# Patient Record
Sex: FEMALE | Race: WHITE
[De-identification: names, ages, dates, MRNs, and addresses within clinical notes are randomized per-mention and may not be internally consistent; named-entity substitution may affect disease eponyms.]

---

## 2019-08-18 NOTE — ER DOCUMENT REPORT
HPI





- HPI


Patient complains to provider of: uti


Time Seen by Provider: 08/18/19 16:02


Onset: Yesterday


Onset/Duration: Gradual


Quality of pain: Burning


Pain Level: 4


Context: 





Patient reports dysuria that started yesterday.  Patient is concerned she may 

have UTI.  Patient denies any flank pain or fever.  Patient denies any nausea or

vomiting.


Associated Symptoms: denies: Fever, Nausea, Vomiting


Exacerbated by: Denies


Relieved by: Denies


Similar symptoms previously: Yes


Recently seen / treated by doctor: No





- ROS


ROS below otherwise negative: Yes


Systems Reviewed and Negative: Yes All other systems reviewed and negative





- CONSTITUTIONAL


Constitutional: DENIES: Fever, Chills





- GASTROINTESTINAL


Gastrointestinal: REPORTS: Abdominal Pain.  DENIES: Nausea, Patient vomiting





- URINARY


Urinary: REPORTS: Dysuria, Frequency.  DENIES: Urgency





- REPRODUCTIVE


Reproductive: DENIES: Pregnant:





- MUSCULOSKELETAL


Musculoskeletal: DENIES: Back Pain





- DERM


Skin Color: Normal


Skin Problems: None





Past Medical History





- General


Information source: Patient





- Social History


Smoking Status: Current Every Day Smoker


Smoking Education Provided: Yes


Frequency of alcohol use: None


Drug Abuse: None


Occupation: none


Family History: Reviewed & Not Pertinent





- Medical History


Medical History: Negative


Surgical Hx: Negative





Vertical Provider Document





- CONSTITUTIONAL


Agree With Documented VS: Yes


Exam Limitations: No Limitations


General Appearance: WD/WN, No Apparent Distress





- INFECTION CONTROL


TRAVEL OUTSIDE OF THE [place] IN LAST 30 DAYS: No





- HEENT


HEENT: Atraumatic, Normocephalic





- NECK


Neck: Normal Inspection, Supple





- RESPIRATORY


Respiratory: Breath Sounds Normal, No Respiratory Distress





- CARDIOVASCULAR


Cardiovascular: Regular Rate, Regular Rhythm





- GI/ABDOMEN


Gastrointestinal: Abdomen Soft, Abdomen Tender - suprapubic





- BACK


Back: Normal Inspection.  negative: CVA Tenderness-Right, CVA Tenderness-Left





- MUSCULOSKELETAL/EXTREMETIES


Musculoskeletal/Extremeties: MAEW, FROM





- NEURO


Level of Consciousness: Awake, Alert, Appropriate


Motor/Sensory: No Motor Deficit





- DERM


Integumentary: Warm, Dry, No Rash





Course





- Re-evaluation


Re-evalutation: 





08/18/19 16:40


Patient with UTI symptoms.  No CVA tenderness, no fever. No concern for 

pyelonephritis.  Patient nontoxic in appearance.  Urine culture is pending at 

this time.





- Vital Signs


Vital signs: 


                                        











Temp Pulse Resp BP Pulse Ox


 


 97.6 F   95   16   126/70 H  97 


 


 08/18/19 15:20  08/18/19 15:20  08/18/19 15:20  08/18/19 15:20  08/18/19 15:20














- Laboratory


Laboratory results interpreted by me: 


                                        











  08/18/19





  16:00


 


Urine Protein  30 H


 


Urine Blood  MODERATE H


 


Ur Leukocyte Esterase  MODERATE H











08/18/19 16:40


                               Labs- Entire Visit











  08/18/19





  16:00


 


Urine Color  YELLOW


 


Urine Appearance  SLIGHTLY-CLOUDY


 


Urine pH  5.0


 


Ur Specific Gravity  1.023


 


Urine Protein  30 H


 


Urine Glucose (UA)  NEGATIVE


 


Urine Ketones  NEGATIVE


 


Urine Blood  MODERATE H


 


Urine Nitrite  NEGATIVE


 


Urine Bilirubin  NEGATIVE


 


Urine Urobilinogen  NEGATIVE


 


Ur Leukocyte Esterase  MODERATE H


 


Urine WBC (Auto)  94


 


Urine RBC (Auto)  17


 


Urine Bacteria (Auto)  TRACE


 


Squamous Epi Cells Auto  5


 


Urine Mucus (Auto)  OCC


 


Urine Ascorbic Acid  NEGATIVE














Discharge





- Discharge


Clinical Impression: 


UTI (urinary tract infection)


Qualifiers:


 Urinary tract infection type: site unspecified Hematuria presence: with 

hematuria Qualified Code(s): N39.0 - Urinary tract infection, site not specified





Condition: Stable


Disposition: HOME, SELF-CARE


Instructions:  Cephalexin ([facility]), Urinary Anesthetic Agent ([facility]), Urinary Tract 

Infection ([facility])


Additional Instructions: 


Return immediately for any new or worsening symptoms





Followup with your primary care provider, call tomorrow to make a followup 

appointment





Urine culture is pending, we will call if you need any different treatment


Prescriptions: 


Cephalexin Monohydrate [Keflex 500 mg Capsule] 500 mg PO Q6H 5 Days  capsule


Phenazopyridine HCl [Pyridium 200 mg Tablet] 200 mg PO TID #15 tablet


Forms:  Smoking Cessation Education


Referrals: 


[facility] [Provider Group] - Follow up as needed

## 2020-02-06 ENCOUNTER — HOSPITAL ENCOUNTER (EMERGENCY)
Dept: HOSPITAL 62 - ER | Age: 20
Discharge: HOME | End: 2020-02-06
Payer: COMMERCIAL

## 2020-02-06 VITALS — DIASTOLIC BLOOD PRESSURE: 68 MMHG | SYSTOLIC BLOOD PRESSURE: 124 MMHG

## 2020-02-06 DIAGNOSIS — O26.892: Primary | ICD-10-CM

## 2020-02-06 DIAGNOSIS — J18.9: ICD-10-CM

## 2020-02-06 DIAGNOSIS — Z3A.21: ICD-10-CM

## 2020-02-06 PROCEDURE — 71046 X-RAY EXAM CHEST 2 VIEWS: CPT

## 2020-02-06 NOTE — ER DOCUMENT REPORT
ED General





- General


Chief Complaint: Cough


Stated Complaint: COUGH


Time Seen by Provider: 02/06/20 08:42


Primary Care Provider: 


TITO CLEVELAND MD [Primary Care Provider] - Follow up as needed


Notes: 





Patient is a 19-year-old white female with past medical history of allergic 

rhinitis who is currently 21 weeks gestation who presents to the emergency 

department with a chief complaint of cough for the past 3 days.  She states it 

started off as a runny nose with intermittent nasal congestion.  She admits to 

associated postnasal drainage that is thick.  She states she is developed a 

cough that is dry over the past 3 days.  States that when she coughs she feels 

pain in her anterior chest.  She reports with deep breathing of cool air if she 

feels a burning sensation in the front of the chest.  She denies any known 

fever, nausea, vomiting, diarrhea, abdominal pain or vaginal bleeding.  Admits 

to sick contacts


TRAVEL OUTSIDE OF THE [place] IN LAST 30 DAYS: No





- Related Data


Allergies/Adverse Reactions: 


                                        





No Known Allergies Allergy (Verified 02/06/20 04:23)


   








Home Medications: PRE NATALS





Past Medical History





- Social History


Smoking Status: Former Smoker


Family History: Reviewed & Not Pertinent


Patient has suicidal ideation: No


Patient has homicidal ideation: No


Renal/ Medical History: Denies: Hx Peritoneal Dialysis





Review of Systems





- Review of Systems


EENT: Nose congestion, Nose discharge


Respiratory: Cough


-: Yes All other systems reviewed and negative





Physical Exam





- Vital signs


Vitals: 


                                        











Temp Pulse Resp BP Pulse Ox


 


 98.8 F   98   18   127/61 H  98 


 


 02/06/20 04:12  02/06/20 04:12  02/06/20 04:12  02/06/20 04:12  02/06/20 04:12














- General


General appearance: Appears well, Alert





- HEENT


Head: Normocephalic, Atraumatic


Eyes: Normal


Conjunctiva: Normal


Extraocular movements intact: Yes


Eyelashes: Normal


Pupils: PERRL


Ears: Normal


External canal: Normal


Tympanic membrane: Normal


Sinus: Normal


Nasal: Normal


Mouth/Lips: Normal


Mucous membranes: Normal


Pharynx: Normal


Neck: Normal





- Respiratory


Respiratory status: No respiratory distress


Chest status: Nontender


Breath sounds: Normal


Chest palpation: Normal





- Cardiovascular


Rhythm: Regular


Heart sounds: Normal auscultation





- Abdominal


Inspection: Normal, Gravid female


Distension: No distension


Bowel sounds: Normal


Tenderness: Nontender


Organomegaly: No organomegaly





- Neurological


Neuro grossly intact: Yes


Cognition: Normal


Orientation: AAOx4


Levels Coma Scale Eye Opening: Spontaneous


Levels Coma Scale Verbal: Oriented


Levels Coma Scale Motor: Obeys Commands


Jeremi Coma Scale Total: 15


Speech: Normal





- Psychological


Associated symptoms: Normal affect, Normal mood





- Skin


Skin Temperature: Warm


Skin Moisture: Dry


Skin Color: Normal





Course





- Re-evaluation


Re-evalutation: 





02/06/20 10:53


X-ray showing what appears to be pneumonia per radiologist.  Patient will be 

placed on Zithromax.  Counseled her regarding the importance of outpatient 

follow-up and advised that she return here or any ER immediately with any new, 

persistent or worsening symptoms.  She verbalized understood and agreed.





- Vital Signs


Vital signs: 


                                        











Temp Pulse Resp BP Pulse Ox


 


 97.9 F   106 H  20   117/68   99 


 


 02/06/20 09:09  02/06/20 09:09  02/06/20 09:09  02/06/20 09:09  02/06/20 09:09














Discharge





- Discharge


Clinical Impression: 


Pneumonia


Qualifiers:


 Pneumonia type: due to unspecified organism Laterality: unspecified laterality 

Lung location: unspecified part of lung Qualified Code(s): J18.9 - Pneumonia, 

unspecified organism





Condition: Stable


Disposition: HOME, SELF-CARE


Instructions:  Pneumonia ([facility])


Additional Instructions: 


Please follow-up with your regular doctor in 2 to 3 days for reevaluation.  

Return here or any ER immediately with any new, persistent or worsening 

symptoms.


Prescriptions: 


Azithromycin [Zithromax 250 mg Tablet] 250 mg PO ASDIR PRN #6 tablet


 PRN Reason: 


Referrals: 


TITO CLEVELAND MD [Primary Care Provider] - Follow up as needed

## 2020-02-06 NOTE — RADIOLOGY REPORT (SQ)
EXAM DESCRIPTION:  CHEST 2 VIEWS



COMPLETED DATE/TIME:  2/6/2020 9:44 am



REASON FOR STUDY:  cough



COMPARISON:  None.



EXAM PARAMETERS:  NUMBER OF VIEWS: two views

TECHNIQUE: Digital Frontal and Lateral radiographic views of the chest acquired.

RADIATION DOSE: NA

LIMITATIONS: none



FINDINGS:  LUNGS AND PLEURA: Focal dense consolidation is present in the superior segment right lower
 lobe worrisome for pneumonia.

Lungs are otherwise clear.  No pleural effusion or pneumothorax.

MEDIASTINUM AND HILAR STRUCTURES: No masses or contour abnormalities.

HEART AND VASCULAR STRUCTURES: Heart normal size.  No evidence for failure.

BONES: No acute findings.

HARDWARE: None in the chest.

OTHER: No other significant finding.



IMPRESSION:  Focal dense consolidation superior segment right lower lobe worrisome for pneumonia



TECHNICAL DOCUMENTATION:  JOB ID:  [id number]

 2011 [email or web address]- All Rights Reserved



Reading location - IP/workstation name: [email or web address]

## 2020-04-02 ENCOUNTER — HOSPITAL ENCOUNTER (OUTPATIENT)
Dept: HOSPITAL 62 - LC | Age: 20
Discharge: HOME | End: 2020-04-02
Attending: OBSTETRICS & GYNECOLOGY
Payer: MEDICAID

## 2020-04-02 DIAGNOSIS — E86.0: ICD-10-CM

## 2020-04-02 DIAGNOSIS — O47.03: Primary | ICD-10-CM

## 2020-04-02 DIAGNOSIS — Z3A.29: ICD-10-CM

## 2020-04-02 DIAGNOSIS — O99.283: ICD-10-CM

## 2020-04-02 LAB
APPEARANCE UR: (no result)
APTT PPP: YELLOW S
BACTERIA (WET MOUNT): (no result)
BARBITURATES UR QL SCN: NEGATIVE
BILIRUB UR QL STRIP: NEGATIVE
CHLAM PCR: NOT DETECTED
EPITHELIALS (WET MOUNT): (no result)
GLUCOSE UR STRIP-MCNC: NEGATIVE MG/DL
KETONES UR STRIP-MCNC: NEGATIVE MG/DL
METHADONE UR QL SCN: NEGATIVE
NITRITE UR QL STRIP: NEGATIVE
PCP UR QL SCN: NEGATIVE
PH UR STRIP: 8 [PH] (ref 5–9)
PROT UR STRIP-MCNC: 30 MG/DL
RBCS (WET MOUNT): (no result)
SP GR UR STRIP: 1.02
T.VAGINALIS (WET MOUNT): (no result)
URINE AMPHETAMINES SCREEN: NEGATIVE
URINE BENZODIAZEPINES SCREEN: NEGATIVE
URINE COCAINE SCREEN: NEGATIVE
URINE MARIJUANA (THC) SCREEN: NEGATIVE
UROBILINOGEN UR-MCNC: NEGATIVE MG/DL (ref ?–2)
WBCS (WET MOUNT): (no result)
YEAST (WET MOUNT): (no result)

## 2020-04-02 PROCEDURE — 80307 DRUG TEST PRSMV CHEM ANLYZR: CPT

## 2020-04-02 PROCEDURE — 76815 OB US LIMITED FETUS(S): CPT

## 2020-04-02 PROCEDURE — 87210 SMEAR WET MOUNT SALINE/INK: CPT

## 2020-04-02 PROCEDURE — 59899 UNLISTED PX MAT CARE&DLVR: CPT

## 2020-04-02 PROCEDURE — 81001 URINALYSIS AUTO W/SCOPE: CPT

## 2020-04-02 PROCEDURE — 87491 CHLMYD TRACH DNA AMP PROBE: CPT

## 2020-04-02 PROCEDURE — 87591 N.GONORRHOEAE DNA AMP PROB: CPT

## 2020-04-02 NOTE — RADIOLOGY REPORT (SQ)
EXAM DESCRIPTION:  U/S OB LIMITED



IMAGES COMPLETED DATE/TIME:  4/2/2020 7:17 pm



REASON FOR STUDY:  cervical length, fetal pres, fluid



COMPARISON:  None.



TECHNIQUE:  Limited transabdominal grayscale ultrasound for evaluation of specific requested obstetri
sushant parameters.



LIMITATIONS:  None.



FINDINGS:  CERVICAL LENGTH: 2.7 cm   Closed.

MAILE: 13.8 cm.

FHR: 149 beats per minute.

PRESENTATION: Breech

PLACENTA: Anterior

FETAL ANATOMY: Not assessed

OTHER: Estimated gestational age 29 weeks 2 days



IMPRESSION:  LIMITED OBSTETRICAL ULTRASOUND WITH MEASURED PARAMETERS DELINEATED ABOVE.

Trimester of pregnancy: Third trimester - 28 weeks to delivery.



TECHNICAL DOCUMENTATION:  JOB ID:  [id number]

 2011 [employer]- All Rights Reserved



Reading location - IP/workstation name: [email or web address]

## 2020-05-02 ENCOUNTER — HOSPITAL ENCOUNTER (OUTPATIENT)
Dept: HOSPITAL 62 - LC | Age: 20
Discharge: HOME | End: 2020-05-02
Attending: OBSTETRICS & GYNECOLOGY
Payer: MEDICARE

## 2020-05-02 DIAGNOSIS — O47.03: Primary | ICD-10-CM

## 2020-05-02 DIAGNOSIS — Z3A.33: ICD-10-CM

## 2020-05-02 LAB
APPEARANCE UR: (no result)
APTT PPP: (no result) S
BACTERIA (WET MOUNT): (no result)
BARBITURATES UR QL SCN: NEGATIVE
BILIRUB UR QL STRIP: NEGATIVE
CHLAM PCR: NOT DETECTED
EPITHELIALS (WET MOUNT): (no result)
GLUCOSE UR STRIP-MCNC: 50 MG/DL
KETONES UR STRIP-MCNC: (no result) MG/DL
METHADONE UR QL SCN: NEGATIVE
NITRITE UR QL STRIP: NEGATIVE
PCP UR QL SCN: NEGATIVE
PH UR STRIP: 5 [PH] (ref 5–9)
PROT UR STRIP-MCNC: 100 MG/DL
RBCS (WET MOUNT): (no result)
SP GR UR STRIP: 1.03
T.VAGINALIS (WET MOUNT): (no result)
URINE AMPHETAMINES SCREEN: NEGATIVE
URINE BENZODIAZEPINES SCREEN: NEGATIVE
URINE COCAINE SCREEN: NEGATIVE
URINE MARIJUANA (THC) SCREEN: NEGATIVE
UROBILINOGEN UR-MCNC: 4 MG/DL (ref ?–2)
WBCS (WET MOUNT): (no result)
YEAST (WET MOUNT): (no result)

## 2020-05-02 PROCEDURE — 87591 N.GONORRHOEAE DNA AMP PROB: CPT

## 2020-05-02 PROCEDURE — 81001 URINALYSIS AUTO W/SCOPE: CPT

## 2020-05-02 PROCEDURE — 59025 FETAL NON-STRESS TEST: CPT

## 2020-05-02 PROCEDURE — 80307 DRUG TEST PRSMV CHEM ANLYZR: CPT

## 2020-05-02 PROCEDURE — 87491 CHLMYD TRACH DNA AMP PROBE: CPT

## 2020-05-02 PROCEDURE — 87086 URINE CULTURE/COLONY COUNT: CPT

## 2020-05-02 PROCEDURE — 84112 EVAL AMNIOTIC FLUID PROTEIN: CPT

## 2020-05-02 PROCEDURE — 87210 SMEAR WET MOUNT SALINE/INK: CPT

## 2020-05-02 NOTE — NON STRESS TEST REPORT
=================================================================

Non Stress Test

=================================================================

Datetime Report Generated by CPN: 05/02/2020 22:12

   

   

=================================================================

DEMOGRAPHIC

=================================================================

   

Test Number:  1

EGA NST:  33.4

   

=================================================================

INDICATION

=================================================================

   

Indication for Study (NST) Other:  LC

   

=================================================================

URINE RESULTS

=================================================================

   

Urine Protein, NST:  Positive

Urine Ketones - NST:  Negative

Urine Glucose - NST:  Negative

Urine Blood - NST:  Negative

   

=================================================================

MONITORING

=================================================================

   

Monitor Explained:  Monitor Explained; Test Explained; Patient

   Verbalized Understanding

Time on Monitor:  05/02/2020 19:35

Time off Monitor:  05/02/2020 21:35

NST Duration:  120

   

=================================================================

NST INTERVENTIONS

=================================================================

   

NST Interventions:  PO Hydration

Physician Notified NST:  Dr. Louis

BABY A:  [id number]

   

=================================================================

BABY A

=================================================================

   

Fetal Movement :  Present

Contraction Frequency :  x1

FHR Baseline :  130

Accelerations :  15X15

Decelerations :  None

Variability :  Moderate 6-25bpm

NST Review:  Meets Criteria for Reactive NST

NST Review and Verified By :  SHRADDHA Tryo RN

NST Results:  Reactive

   

=================================================================

NST REPORT

=================================================================

   

Report Trigger:  Send Report

## 2020-05-28 ENCOUNTER — HOSPITAL ENCOUNTER (OUTPATIENT)
Dept: HOSPITAL 62 - LC | Age: 20
Discharge: HOME | End: 2020-05-28
Attending: OBSTETRICS & GYNECOLOGY
Payer: MEDICARE

## 2020-05-28 DIAGNOSIS — O47.1: Primary | ICD-10-CM

## 2020-05-28 DIAGNOSIS — Z3A.37: ICD-10-CM

## 2020-05-28 LAB
APPEARANCE UR: (no result)
APTT PPP: YELLOW S
BARBITURATES UR QL SCN: NEGATIVE
BILIRUB UR QL STRIP: NEGATIVE
GLUCOSE UR STRIP-MCNC: NEGATIVE MG/DL
KETONES UR STRIP-MCNC: (no result) MG/DL
METHADONE UR QL SCN: NEGATIVE
NITRITE UR QL STRIP: NEGATIVE
PCP UR QL SCN: NEGATIVE
PH UR STRIP: 6 [PH] (ref 5–9)
PROT UR STRIP-MCNC: NEGATIVE MG/DL
SP GR UR STRIP: 1.01
URINE AMPHETAMINES SCREEN: NEGATIVE
URINE BENZODIAZEPINES SCREEN: NEGATIVE
URINE COCAINE SCREEN: NEGATIVE
URINE MARIJUANA (THC) SCREEN: NEGATIVE
UROBILINOGEN UR-MCNC: NEGATIVE MG/DL (ref ?–2)

## 2020-05-28 PROCEDURE — 59025 FETAL NON-STRESS TEST: CPT

## 2020-05-28 PROCEDURE — 81005 URINALYSIS: CPT

## 2020-05-28 PROCEDURE — 80307 DRUG TEST PRSMV CHEM ANLYZR: CPT

## 2020-06-07 ENCOUNTER — HOSPITAL ENCOUNTER (OUTPATIENT)
Dept: HOSPITAL 62 - LC | Age: 20
Discharge: HOME | End: 2020-06-07
Attending: OBSTETRICS & GYNECOLOGY
Payer: MEDICARE

## 2020-06-07 DIAGNOSIS — O47.1: Primary | ICD-10-CM

## 2020-06-07 DIAGNOSIS — Z3A.38: ICD-10-CM

## 2020-06-07 LAB
APPEARANCE UR: (no result)
APTT PPP: (no result) S
BACTERIA (WET MOUNT): (no result)
BARBITURATES UR QL SCN: NEGATIVE
BILIRUB UR QL STRIP: NEGATIVE
CHLAM PCR: NOT DETECTED
EPITHELIALS (WET MOUNT): (no result)
GLUCOSE UR STRIP-MCNC: NEGATIVE MG/DL
KETONES UR STRIP-MCNC: NEGATIVE MG/DL
METHADONE UR QL SCN: NEGATIVE
NITRITE UR QL STRIP: NEGATIVE
PCP UR QL SCN: NEGATIVE
PH UR STRIP: 6 [PH] (ref 5–9)
PROT UR STRIP-MCNC: 30 MG/DL
RBCS (WET MOUNT): (no result)
SP GR UR STRIP: 1.02
T.VAGINALIS (WET MOUNT): (no result)
URINE AMPHETAMINES SCREEN: NEGATIVE
URINE BENZODIAZEPINES SCREEN: NEGATIVE
URINE COCAINE SCREEN: NEGATIVE
URINE MARIJUANA (THC) SCREEN: NEGATIVE
UROBILINOGEN UR-MCNC: NEGATIVE MG/DL (ref ?–2)
WBCS (WET MOUNT): (no result)
YEAST (WET MOUNT): (no result)

## 2020-06-07 PROCEDURE — 87491 CHLMYD TRACH DNA AMP PROBE: CPT

## 2020-06-07 PROCEDURE — 87210 SMEAR WET MOUNT SALINE/INK: CPT

## 2020-06-07 PROCEDURE — 80307 DRUG TEST PRSMV CHEM ANLYZR: CPT

## 2020-06-07 PROCEDURE — 87591 N.GONORRHOEAE DNA AMP PROB: CPT

## 2020-06-07 PROCEDURE — 59025 FETAL NON-STRESS TEST: CPT

## 2020-06-07 PROCEDURE — 81001 URINALYSIS AUTO W/SCOPE: CPT

## 2020-06-07 PROCEDURE — 84112 EVAL AMNIOTIC FLUID PROTEIN: CPT

## 2020-06-07 NOTE — NON STRESS TEST REPORT
=================================================================

Non Stress Test

=================================================================

Datetime Report Generated by CPN: 06/07/2020 11:56

   

   

=================================================================

DEMOGRAPHIC

=================================================================

   

EGA NST:  38.5

   

=================================================================

INDICATION

=================================================================

   

Indication for Study (NST) Other:  LC- not ruptured

   

=================================================================

MONITORING

=================================================================

   

Monitor Explained:  Monitor Explained; Test Explained; Patient

   Verbalized Understanding

Time on Monitor:  06/07/2020 10:30

Time off Monitor:  06/07/2020 10:51

NST Duration:  21

   

=================================================================

NST INTERVENTIONS

=================================================================

   

NST Interventions:  PO Hydration

Physician Notified NST:  K. Langley CNM

   

=================================================================

BABY A

=================================================================

   

Fetal Movement :  Present

Contraction Frequency :  irregular

Accelerations :  15X15

Decelerations :  None

Variability :  Moderate 6-25bpm

NST Review:  Meets Criteria for Reactive NST

NST Review and Verified By :  THAI De Jesus RN

NST Results:  Reactive

   

=================================================================

NST COMMENTS

=================================================================

   

NST Comments:  CNM on unit reviewing FHT strip 

   

=================================================================

NST REPORT

=================================================================

   

Report Trigger:  Send Report

## 2020-06-08 ENCOUNTER — HOSPITAL ENCOUNTER (OUTPATIENT)
Dept: HOSPITAL 62 - LC | Age: 20
Discharge: HOME | End: 2020-06-08
Attending: OBSTETRICS & GYNECOLOGY
Payer: MEDICARE

## 2020-06-08 DIAGNOSIS — Z91.018: ICD-10-CM

## 2020-06-08 DIAGNOSIS — O47.1: Primary | ICD-10-CM

## 2020-06-08 DIAGNOSIS — Z3A.38: ICD-10-CM

## 2020-06-08 LAB
APPEARANCE UR: CLEAR
APTT PPP: YELLOW S
BARBITURATES UR QL SCN: NEGATIVE
BILIRUB UR QL STRIP: NEGATIVE
GLUCOSE UR STRIP-MCNC: NEGATIVE MG/DL
KETONES UR STRIP-MCNC: NEGATIVE MG/DL
METHADONE UR QL SCN: NEGATIVE
NITRITE UR QL STRIP: NEGATIVE
PCP UR QL SCN: NEGATIVE
PH UR STRIP: 7 [PH] (ref 5–9)
PROT UR STRIP-MCNC: NEGATIVE MG/DL
SP GR UR STRIP: 1
URINE AMPHETAMINES SCREEN: NEGATIVE
URINE BENZODIAZEPINES SCREEN: NEGATIVE
URINE COCAINE SCREEN: NEGATIVE
URINE MARIJUANA (THC) SCREEN: NEGATIVE
UROBILINOGEN UR-MCNC: NEGATIVE MG/DL (ref ?–2)

## 2020-06-08 PROCEDURE — 81005 URINALYSIS: CPT

## 2020-06-08 PROCEDURE — 80307 DRUG TEST PRSMV CHEM ANLYZR: CPT

## 2020-06-11 ENCOUNTER — HOSPITAL ENCOUNTER (OUTPATIENT)
Dept: HOSPITAL 62 - LC | Age: 20
Discharge: HOME | End: 2020-06-11
Attending: OBSTETRICS & GYNECOLOGY
Payer: MEDICARE

## 2020-06-11 DIAGNOSIS — Z3A.39: ICD-10-CM

## 2020-06-11 DIAGNOSIS — Z91.018: ICD-10-CM

## 2020-06-11 DIAGNOSIS — O47.1: Primary | ICD-10-CM

## 2020-06-11 LAB
APPEARANCE UR: CLEAR
APTT PPP: YELLOW S
BARBITURATES UR QL SCN: NEGATIVE
BILIRUB UR QL STRIP: NEGATIVE
GLUCOSE UR STRIP-MCNC: NEGATIVE MG/DL
KETONES UR STRIP-MCNC: NEGATIVE MG/DL
METHADONE UR QL SCN: NEGATIVE
NITRITE UR QL STRIP: NEGATIVE
PCP UR QL SCN: NEGATIVE
PH UR STRIP: 7 [PH] (ref 5–9)
PROT UR STRIP-MCNC: NEGATIVE MG/DL
SP GR UR STRIP: 1.01
URINE AMPHETAMINES SCREEN: NEGATIVE
URINE BENZODIAZEPINES SCREEN: NEGATIVE
URINE COCAINE SCREEN: NEGATIVE
URINE MARIJUANA (THC) SCREEN: NEGATIVE
UROBILINOGEN UR-MCNC: NEGATIVE MG/DL (ref ?–2)

## 2020-06-11 PROCEDURE — 81005 URINALYSIS: CPT

## 2020-06-11 PROCEDURE — 80307 DRUG TEST PRSMV CHEM ANLYZR: CPT

## 2020-06-13 ENCOUNTER — HOSPITAL ENCOUNTER (OUTPATIENT)
Dept: HOSPITAL 62 - LC | Age: 20
Discharge: HOME | End: 2020-06-13
Attending: OBSTETRICS & GYNECOLOGY
Payer: MEDICARE

## 2020-06-13 DIAGNOSIS — Z3A.39: ICD-10-CM

## 2020-06-13 DIAGNOSIS — O47.1: Primary | ICD-10-CM

## 2020-06-13 LAB
APPEARANCE UR: CLEAR
APTT PPP: YELLOW S
BARBITURATES UR QL SCN: NEGATIVE
BILIRUB UR QL STRIP: NEGATIVE
GLUCOSE UR STRIP-MCNC: NEGATIVE MG/DL
KETONES UR STRIP-MCNC: (no result) MG/DL
METHADONE UR QL SCN: NEGATIVE
NITRITE UR QL STRIP: NEGATIVE
PCP UR QL SCN: NEGATIVE
PH UR STRIP: 6 [PH] (ref 5–9)
PROT UR STRIP-MCNC: NEGATIVE MG/DL
SP GR UR STRIP: 1.01
URINE AMPHETAMINES SCREEN: NEGATIVE
URINE BENZODIAZEPINES SCREEN: NEGATIVE
URINE COCAINE SCREEN: NEGATIVE
URINE MARIJUANA (THC) SCREEN: NEGATIVE
UROBILINOGEN UR-MCNC: 2 MG/DL (ref ?–2)

## 2020-06-13 PROCEDURE — 81005 URINALYSIS: CPT

## 2020-06-13 PROCEDURE — 84112 EVAL AMNIOTIC FLUID PROTEIN: CPT

## 2020-06-13 PROCEDURE — 80307 DRUG TEST PRSMV CHEM ANLYZR: CPT

## 2020-06-15 ENCOUNTER — HOSPITAL ENCOUNTER (INPATIENT)
Dept: HOSPITAL 62 - LC | Age: 20
LOS: 3 days | Discharge: HOME | End: 2020-06-18
Attending: OBSTETRICS & GYNECOLOGY | Admitting: OBSTETRICS & GYNECOLOGY
Payer: MEDICARE

## 2020-06-15 DIAGNOSIS — Z3A.39: ICD-10-CM

## 2020-06-15 DIAGNOSIS — F20.0: ICD-10-CM

## 2020-06-15 DIAGNOSIS — F81.9: ICD-10-CM

## 2020-06-15 DIAGNOSIS — Z86.19: ICD-10-CM

## 2020-06-15 DIAGNOSIS — F90.9: ICD-10-CM

## 2020-06-15 DIAGNOSIS — F31.9: ICD-10-CM

## 2020-06-15 LAB
ADD MANUAL DIFF: NO
APPEARANCE UR: (no result)
APTT PPP: YELLOW S
BARBITURATES UR QL SCN: NEGATIVE
BASOPHILS # BLD AUTO: 0 10^3/UL (ref 0–0.2)
BASOPHILS NFR BLD AUTO: 0.4 % (ref 0–2)
BILIRUB UR QL STRIP: NEGATIVE
EOSINOPHIL # BLD AUTO: 0 10^3/UL (ref 0–0.6)
EOSINOPHIL NFR BLD AUTO: 0.2 % (ref 0–6)
ERYTHROCYTE [DISTWIDTH] IN BLOOD BY AUTOMATED COUNT: 14 % (ref 11.5–14)
GLUCOSE UR STRIP-MCNC: NEGATIVE MG/DL
HCT VFR BLD CALC: 37.7 % (ref 36–47)
HGB BLD-MCNC: 12.9 G/DL (ref 12–15.5)
KETONES UR STRIP-MCNC: (no result) MG/DL
LYMPHOCYTES # BLD AUTO: 1.7 10^3/UL (ref 0.5–4.7)
LYMPHOCYTES NFR BLD AUTO: 15.7 % (ref 13–45)
MCH RBC QN AUTO: 30.2 PG (ref 27–33.4)
MCHC RBC AUTO-ENTMCNC: 34.2 G/DL (ref 32–36)
MCV RBC AUTO: 89 FL (ref 80–97)
METHADONE UR QL SCN: NEGATIVE
MONOCYTES # BLD AUTO: 0.4 10^3/UL (ref 0.1–1.4)
MONOCYTES NFR BLD AUTO: 4.2 % (ref 3–13)
NEUTROPHILS # BLD AUTO: 8.5 10^3/UL (ref 1.7–8.2)
NEUTS SEG NFR BLD AUTO: 79.5 % (ref 42–78)
NITRITE UR QL STRIP: NEGATIVE
PCP UR QL SCN: NEGATIVE
PH UR STRIP: 7 [PH] (ref 5–9)
PLATELET # BLD: 235 10^3/UL (ref 150–450)
PROT UR STRIP-MCNC: NEGATIVE MG/DL
RBC # BLD AUTO: 4.26 10^6/UL (ref 3.72–5.28)
SP GR UR STRIP: 1
TOTAL CELLS COUNTED % (AUTO): 100 %
URINE AMPHETAMINES SCREEN: NEGATIVE
URINE BENZODIAZEPINES SCREEN: NEGATIVE
URINE COCAINE SCREEN: NEGATIVE
URINE MARIJUANA (THC) SCREEN: NEGATIVE
UROBILINOGEN UR-MCNC: NEGATIVE MG/DL (ref ?–2)
WBC # BLD AUTO: 10.6 10^3/UL (ref 4–10.5)

## 2020-06-15 PROCEDURE — 84112 EVAL AMNIOTIC FLUID PROTEIN: CPT

## 2020-06-15 PROCEDURE — 85025 COMPLETE CBC W/AUTO DIFF WBC: CPT

## 2020-06-15 PROCEDURE — 86592 SYPHILIS TEST NON-TREP QUAL: CPT

## 2020-06-15 PROCEDURE — 81005 URINALYSIS: CPT

## 2020-06-15 PROCEDURE — 80307 DRUG TEST PRSMV CHEM ANLYZR: CPT

## 2020-06-15 PROCEDURE — 86901 BLOOD TYPING SEROLOGIC RH(D): CPT

## 2020-06-15 PROCEDURE — 85027 COMPLETE CBC AUTOMATED: CPT

## 2020-06-15 PROCEDURE — 36415 COLL VENOUS BLD VENIPUNCTURE: CPT

## 2020-06-15 PROCEDURE — 86850 RBC ANTIBODY SCREEN: CPT

## 2020-06-15 PROCEDURE — 86900 BLOOD TYPING SEROLOGIC ABO: CPT
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
## 2020-06-15 NOTE — NON STRESS TEST REPORT
=================================================================

Non Stress Test

=================================================================

Datetime Report Generated by CPN: 06/15/2020 15:07

   

   

=================================================================

DEMOGRAPHIC

=================================================================

   

EGA NST:  39.4

   

=================================================================

INDICATION

=================================================================

   

Indication for Study (NST) Other:  Labor check

   

=================================================================

MONITORING

=================================================================

   

Monitor Explained:  Monitor Explained; Test Explained; Patient

   Verbalized Understanding

Time on Monitor:  06/13/2020 21:15

Time off Monitor:  06/13/2020 22:25

NST Duration:  70

   

=================================================================

NST INTERVENTIONS

=================================================================

   

NST Interventions:  PO Hydration

Physician Notified NST:  Dr. Littlejohn

BABY A:  [id number]

   

=================================================================

BABY A

=================================================================

   

Fetal Movement :  Present

Contraction Frequency :  irregular

FHR Baseline :  135

Accelerations :  15X15

Decelerations :  None

Variability :  Moderate 6-25bpm

NST Review:  Meets Criteria for Reactive NST

NST Review and Verified By :  SHRADDHA Troy RN

NST Results:  Reactive

   

=================================================================

NST REPORT

=================================================================

   

Report Trigger:  Send Report

## 2020-06-16 LAB
ERYTHROCYTE [DISTWIDTH] IN BLOOD BY AUTOMATED COUNT: 14 % (ref 11.5–14)
HCT VFR BLD CALC: 33.1 % (ref 36–47)
HGB BLD-MCNC: 11.4 G/DL (ref 12–15.5)
MCH RBC QN AUTO: 30.2 PG (ref 27–33.4)
MCHC RBC AUTO-ENTMCNC: 34.3 G/DL (ref 32–36)
MCV RBC AUTO: 88 FL (ref 80–97)
PLATELET # BLD: 205 10^3/UL (ref 150–450)
RBC # BLD AUTO: 3.76 10^6/UL (ref 3.72–5.28)
WBC # BLD AUTO: 12.1 10^3/UL (ref 4–10.5)

## 2020-06-16 RX ADMIN — SENNOSIDES, DOCUSATE SODIUM SCH: 50; 8.6 TABLET, FILM COATED ORAL at 09:52

## 2020-06-16 RX ADMIN — DOCUSATE SODIUM SCH MG: 100 CAPSULE, LIQUID FILLED ORAL at 19:06

## 2020-06-16 RX ADMIN — FAMOTIDINE SCH: 20 TABLET, FILM COATED ORAL at 09:52

## 2020-06-16 RX ADMIN — IBUPROFEN SCH MG: 800 TABLET, FILM COATED ORAL at 19:06

## 2020-06-16 RX ADMIN — Medication SCH CAP: at 09:47

## 2020-06-16 RX ADMIN — DOCUSATE SODIUM SCH MG: 100 CAPSULE, LIQUID FILLED ORAL at 09:47

## 2020-06-16 RX ADMIN — FERROUS SULFATE TAB 325 MG (65 MG ELEMENTAL FE) SCH MG: 325 (65 FE) TAB at 19:05

## 2020-06-16 RX ADMIN — FERROUS SULFATE TAB 325 MG (65 MG ELEMENTAL FE) SCH MG: 325 (65 FE) TAB at 09:48

## 2020-06-16 RX ADMIN — FAMOTIDINE SCH MG: 20 TABLET, FILM COATED ORAL at 21:03

## 2020-06-16 RX ADMIN — FAMOTIDINE SCH: 20 TABLET, FILM COATED ORAL at 03:51

## 2020-06-16 NOTE — PDOC PROGRESS REPORT
Subjective-OB


Progress Note for:: 06/16/20





Physical Exam (OB)


Vital Signs: 


                                        











Temp Pulse Resp BP Pulse Ox


 


 97.9 F   83   16   126/78 H  100 


 


 06/16/20 07:55  06/16/20 07:55  06/16/20 07:55  06/16/20 07:55  06/16/20 07:55








                                 Intake & Output











 06/15/20 06/16/20 06/17/20





 06:59 06:59 06:59


 


Intake Total   580


 


Output Total  700 


 


Balance  -700 580


 


Weight  82.6 kg 














- PIH/Pre-Eclampsia


Headache: Absent


Epigastric Pain: No


Visual Changes: No





- Lochia


Lochia Amount: Small 10-25 ml


Lochia Color: Rubra/Red





- Abdomen


Description: Tender


Hernia Present: No


Bowel Sounds: Normoactive


Flatus Presence: Present


Stool: No


Fundal Description: Firm


Fundal Height: u/u - u/2





Objective-Diagnostic


Laboratory: 


                                        





                                 06/16/20 07:48 





                                        











  06/15/20 06/15/20 06/15/20





  16:35 18:37 18:37


 


WBC   10.6 H 


 


RBC   4.26 


 


Hgb   12.9 


 


Hct   37.7 


 


MCV   89 


 


MCH   30.2 


 


MCHC   34.2 


 


RDW   14.0 


 


Plt Count   235 


 


Seg Neutrophils %   79.5 H 


 


Urine Color  YELLOW  


 


Urine Appearance  SLIGHTLY-CLOUDY  


 


Urine pH  7.0  


 


Ur Specific Gravity  1.005  


 


Urine Protein  NEGATIVE  


 


Urine Glucose (UA)  NEGATIVE  


 


Urine Ketones  TRACE H  


 


Urine Blood  NEGATIVE  


 


Urine Nitrite  NEGATIVE  


 


Ur Leukocyte Esterase  LARGE H  


 


Blood Type    A POSITIVE


 


Antibody Screen    NEGATIVE














  06/16/20





  07:48


 


WBC  12.1 H


 


RBC  3.76


 


Hgb  11.4 L


 


Hct  33.1 L


 


MCV  88


 


MCH  30.2


 


MCHC  34.3


 


RDW  14.0


 


Plt Count  205


 


Seg Neutrophils % 


 


Urine Color 


 


Urine Appearance 


 


Urine pH 


 


Ur Specific Gravity 


 


Urine Protein 


 


Urine Glucose (UA) 


 


Urine Ketones 


 


Urine Blood 


 


Urine Nitrite 


 


Ur Leukocyte Esterase 


 


Blood Type 


 


Antibody Screen
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
## 2020-06-16 NOTE — DELIVERY SUMMARY
=================================================================

Del Sum A-C

=================================================================

Datetime Report Generated by CPN: 2020 01:01

   

   

=================================================================

DELIVERY PERSONNEL

=================================================================

   

DELIVERY PERSONNEL:  [id number]

Delivery Doctor::  Mimi Dickson MD

Labor and Delivery Nurse::  Agnieszka Moe RN

Labor and Delivery Nurse::  Sinai Perez RN

Circulator::  ALIDA Slater

Nursery Nurse::  marika mustafa RN

Nursery Nurse::  slim milian RN

Scrub Tech/CNA:  Tiffany Aguilar, ST

   

=================================================================

MATERNAL INFORMATION

=================================================================

   

Delivery Anesthesia:  None

Medications After Delivery:  Pitocin 30 Units in 500ml NS/D5W

Estimated  Blood Loss (ml):  200

Maternal Complications:  Other

Complication Details:  mental

   

=================================================================

LABOR SUMMARY

=================================================================

   

EDC:  2020 00:00

No. Babies in Womb:  1

 Attempted:  No

Labor Anesthesia:  IV Sedation

   

=================================================================

LABOR INFORMATION

=================================================================

   

Reason for Induction:  Not Applicable

Onset of Labor:  06/15/2020 17:36

Complete Dilatation:  06/15/2020 23:10

Oxytocin:  Augmentation

Group B Beta Strep:  POSITIVE

Antibiotics # of Doses:  1

Antibiotics Time of Last Dose:  

Name of Antibiotic Given:  penicillin

Steroids Given:  None

Reason Steroids Not Administered:  Not Applicable

   

=================================================================

MEMBRANES

=================================================================

   

Membranes Rupture Method:  Artificial

Rupture of Membranes:  06/15/2020 21:28

Length of Rupture (hr):  2.00

Amniotic Fluid Color:  Clear

Amniotic Fluid Amount:  Small

Amniotic Fluid Odor:  Normal

   

=================================================================

STAGES OF LABOR

=================================================================

   

Stage 1 hr:  5

Stage 1 min:  34

Stage 2 hr:  0

Stage 2 min:  18

Stage 3 hr:  24

Stage 3 min:  3

Total Time in Labor hr:  29

Total Time in Labor min:  55

   

=================================================================

VAGINAL DELIVERY

=================================================================

   

Episiotomy:  None

Laceration #1:  Vaginal

Laceration Extension #1:  Second Degree

Laceration Repair:  Yes

Laceration Repair Note:  2-0 chromic in normal fashion

Sponge Count Correct:  Yes

Sharps Count Correct:  Yes

   

=================================================================

CSECTION DELIVERY

=================================================================

   

Primary Indication:  N/A

Secondary Indication:  N/A

CSection Incidence:  N/A

Labor:  N/A

Elective:  N/A

CSection Incision:  N/A

   

=================================================================

BABY A INFORMATION

=================================================================

   

Infant Delivery Date/Time:  06/15/2020 23:28

Method of Delivery:  Vaginal

Nurse Controlled Delivery:  No

Born in Route :  No

:  N/A

Forceps:  N/A

Vacuum Extraction:  N/A

Shoulder Dystocia :  No

   

=================================================================

PRESENTATION/POSITION BABY A

=================================================================

   

Presentation:  Cephalic

Cephalic Presentation:  Vertex

Vertex Position:  Left Occipital Anterior

Breech Presentation:  N/A

   

=================================================================

PLACENTA INFORMATION BABY A

=================================================================

   

Placenta Delivery Time :  2020 23:31

Placenta Method of Delivery:  Spontaneous

Placenta Status:  Delivered

   

=================================================================

APGAR SCORES BABY A

=================================================================

   

Heart Rate 1 min:  >100 bpm

Resp Effort 1 min:  Slow, Irregular

Reflex Irritability 1 min:  Cough or Sneeze or Pulls Away

Muscle Tone 1 min:  Active Motion

Color 1 min:  Body Pink, Extremities Blue

Resuscitation Effort 1 min:  Tactile Stimulation

APGAR SCORE 1 MIN:  8

Heart Rate 5 min:  >100 bpm

Resp Effort 5 min:  Good Cry

Reflex Irritability 5 min:  Cough or Sneeze or Pulls Away

Muscle Tone 5 min:  Active Motion

Color 5 min:  Body Pink, Extremities Blue

Resuscitation Effort 5 min:  Tactile Stimulation

APGAR SCORE 5 MIN:  9

   

=================================================================

INFANT INFORMATION BABY A

=================================================================

   

Gestational Age at Delivery:  39.6

Gestational Status:  Full Term- 39- 40.6 Weeks

Infant Outcome :  Liveborn

Infant Condition :  Stable

Infant Sex:  Female

   

=================================================================

IDENTIFICATION BABY A

=================================================================

   

Infant Verification Date/Time:  06/15/2020 23:37

ID Band Number:  [id number]

Mother's Name Verified:  Yes

Infant Medical Record Number:  [id number]

RN Verifying Infant:  SJose Moe, RN

Additional Verifying Personnel:  LJose Aguilar, CST

   

=================================================================

WEIGHT/LENGTH BABY A

=================================================================

   

Infant Birthweight (gm):  3455

Infant Weight (lb):  7

Infant Weight (oz):  10

Infant Length (in):  21.00

Infant Length (cm):  53.34

   

=================================================================

CORD INFORMATION BABY A

=================================================================

   

No. Cord Vessels:  3

Nuchal Cord :  Around Neck x1, Loose

Cord Blood Taken:  Yes-For Storage (Mom's Blood type +)

Infant Suction:  Mouth; Nose

   

=================================================================

BABY B INFORMATION

=================================================================

   

 :  N/A

   

=================================================================

SIGNATURES

=================================================================

   

Signature:  Electronically signed by Mimi Dickson MD (ANDDO) on

   6/15/2020 at 23:56  with User ID: [email or web address] Skin normal color for race, warm, dry and intact. No evidence of rash.

## 2020-06-17 RX ADMIN — FAMOTIDINE SCH MG: 20 TABLET, FILM COATED ORAL at 10:20

## 2020-06-17 RX ADMIN — FAMOTIDINE SCH: 20 TABLET, FILM COATED ORAL at 22:12

## 2020-06-17 RX ADMIN — OLANZAPINE SCH MG: 2.5 TABLET, FILM COATED ORAL at 10:19

## 2020-06-17 RX ADMIN — FERROUS SULFATE TAB 325 MG (65 MG ELEMENTAL FE) SCH MG: 325 (65 FE) TAB at 10:22

## 2020-06-17 RX ADMIN — OLANZAPINE SCH MG: 2.5 TABLET, FILM COATED ORAL at 18:18

## 2020-06-17 RX ADMIN — IBUPROFEN SCH MG: 800 TABLET, FILM COATED ORAL at 18:18

## 2020-06-17 RX ADMIN — DOCUSATE SODIUM SCH MG: 100 CAPSULE, LIQUID FILLED ORAL at 10:21

## 2020-06-17 RX ADMIN — Medication SCH CAP: at 10:20

## 2020-06-17 RX ADMIN — SENNOSIDES, DOCUSATE SODIUM SCH EACH: 50; 8.6 TABLET, FILM COATED ORAL at 10:22

## 2020-06-17 RX ADMIN — DOCUSATE SODIUM SCH MG: 100 CAPSULE, LIQUID FILLED ORAL at 18:18

## 2020-06-17 RX ADMIN — FERROUS SULFATE TAB 325 MG (65 MG ELEMENTAL FE) SCH MG: 325 (65 FE) TAB at 18:18

## 2020-06-17 RX ADMIN — IBUPROFEN SCH MG: 800 TABLET, FILM COATED ORAL at 10:21

## 2020-06-17 RX ADMIN — IBUPROFEN SCH: 800 TABLET, FILM COATED ORAL at 02:27

## 2020-06-17 NOTE — PDOC PROGRESS REPORT
Subjective-OB


Progress Note for:: 06/17/20


Subjective: 


Pt is on the phone with her sister who she lives with.  FOB at bedside. Pt is 

distressed and cannot answer question about her bleeding, whether it is heavy or

light.  She seem to be confused and frustrated.  Pt cannot seem to answer simple

questions on her own and is seemingly overwhelmed with having different people 

in her room to check on her and ask her questions.  She is unable to hold a 

conversation and is on the verge of tears.  Her sister on the phone was trying 

to keep her calm and explain that we are just there to check on her because 

patient states that people keep coming in and leaving her. RN in room trying to 

assess patient.








Physical Exam (OB)


Vital Signs: 


                                        











Temp Pulse Resp BP Pulse Ox


 


 97.8 F   86   20   121/64   98 


 


 06/17/20 08:00  06/17/20 08:00  06/17/20 08:00  06/17/20 08:00  06/17/20 08:00








                                 Intake & Output











 06/16/20 06/17/20 06/18/20





 06:59 06:59 06:59


 


Intake Total  1180 480


 


Output Total 700  


 


Balance -700 1180 480


 


Weight 82.6 kg  














- PIH/Pre-Eclampsia


DTR's: 2 +


Clonus: Negative


Headache: Absent


Epigastric Pain: No


Visual Changes: No





- Lochia


Lochia Amount: Scant < 10 ml


Lochia Color: Rubra/Red





- Abdomen


Description: Soft


Hernia Present: Yes


Fundal Description: Firm


Fundal Height: u/u - u/2





Objective-Diagnostic


Laboratory: 


                                        





                                 06/16/20 07:48 











Assessment and Plan(PN)





- Assessment and Plan


(1) Active labor at term


Is this a current diagnosis for this admission?: Yes   





(2) Bipolar 1 disorder


Is this a current diagnosis for this admission?: Yes   








(4) Learning disability


Is this a current diagnosis for this admission?: Yes   





(5) Pregnancy


Qualifiers: 


   Weeks of gestation: unspecified   Qualified Code(s): Z34.90 - Encounter for 

supervision of normal pregnancy, unspecified, unspecified trimester   


Is this a current diagnosis for this admission?: Yes   





(6) Schizophrenia


Qualifiers: 


   Schizophrenia type: unspecified   Qualified Code(s): F20.9 - Schizophrenia, 

unspecified   


Is this a current diagnosis for this admission?: Yes   





- Time Spent with Patient


Time with patient: Less than 15 minutes


Medications reviewed and adjusted accordingly: Yes





- Disposition


Anticipated Discharge: Home


Within: within 24 hours

## 2020-06-18 VITALS — DIASTOLIC BLOOD PRESSURE: 64 MMHG | SYSTOLIC BLOOD PRESSURE: 121 MMHG

## 2020-06-18 RX ADMIN — Medication SCH CAP: at 09:47

## 2020-06-18 RX ADMIN — FAMOTIDINE SCH MG: 20 TABLET, FILM COATED ORAL at 09:47

## 2020-06-18 RX ADMIN — SENNOSIDES, DOCUSATE SODIUM SCH EACH: 50; 8.6 TABLET, FILM COATED ORAL at 09:47

## 2020-06-18 RX ADMIN — IBUPROFEN SCH MG: 800 TABLET, FILM COATED ORAL at 01:22

## 2020-06-18 RX ADMIN — IBUPROFEN SCH MG: 800 TABLET, FILM COATED ORAL at 09:47

## 2020-06-18 RX ADMIN — DOCUSATE SODIUM SCH MG: 100 CAPSULE, LIQUID FILLED ORAL at 09:48

## 2020-06-18 RX ADMIN — FERROUS SULFATE TAB 325 MG (65 MG ELEMENTAL FE) SCH MG: 325 (65 FE) TAB at 09:47

## 2020-06-18 NOTE — PDOC DISCHARGE SUMMARY
Impression





- Admit/DC Date/PCP


Admission Date/Primary Care Provider: 


  06/15/20 17:50





  KELVIN BROOKS MD





Discharge Date: 06/18/20





- Discharge Diagnosis


(1) Delivery normal


Is this a current diagnosis for this admission?: Yes   





(2) Active labor at term


Is this a current diagnosis for this admission?: Yes   





(3) Learning disability


Is this a current diagnosis for this admission?: Yes   





(4) Schizophrenia


Is this a current diagnosis for this admission?: Yes   





(5) Bipolar 1 disorder


Is this a current diagnosis for this admission?: Yes   





- Additional Information


Resuscitation Status: Full Code


Discharge Diet: As Tolerated, Regular


Discharge Activity: Activity As Tolerated, No Lifting Over 10 Pounds, Pelvic 

Rest


Referrals: 


KELVIN BROOKS MD [Primary Care Provider] -  (2 weeks at [facility] made 

by sister)


Home Medications: 








Prenat 115/Iron Fum/Folic/Dss [Prenatal 19 Tablet] 1 tab PO DAILY 04/02/20 


Sertraline HCl 25 mg PO DAILY 06/15/20 











HPI


Gestational Age: 39.6


Reason(s) for Admission: Onset of Labor, Group B Strep Positive


Prenatal Procedures: Ultrasound


Intrapartum Procedure(s): Spontaneous Vaginal Delivery


Postpartum Complication(s): Laceration-Vaginal


Laceration-Degree: 2nd





Hospital Course


Hospital Course: 


routine





Results


Laboratory Results: 


                                        











WBC  12.1 10^3/uL (4.0-10.5)  H  06/16/20  07:48    


 


RBC  3.76 10^6/uL (3.72-5.28)   06/16/20  07:48    


 


Hgb  11.4 g/dL (12.0-15.5)  L  06/16/20  07:48    


 


Hct  33.1 % (36.0-47.0)  L  06/16/20  07:48    


 


MCV  88 fl (80-97)   06/16/20  07:48    


 


MCH  30.2 pg (27.0-33.4)   06/16/20  07:48    


 


MCHC  34.3 g/dL (32.0-36.0)   06/16/20  07:48    


 


RDW  14.0 % (11.5-14.0)   06/16/20  07:48    


 


Plt Count  205 10^3/uL (150-450)   06/16/20  07:48    


 


Lymph % (Auto)  15.7 % (13-45)   06/15/20  18:37    


 


Mono % (Auto)  4.2 % (3-13)   06/15/20  18:37    


 


Eos % (Auto)  0.2 % (0-6)   06/15/20  18:37    


 


Baso % (Auto)  0.4 % (0-2)   06/15/20  18:37    


 


Absolute Neuts (auto)  8.5 10^3/uL (1.7-8.2)  H  06/15/20  18:37    


 


Absolute Lymphs (auto)  1.7 10^3/uL (0.5-4.7)   06/15/20  18:37    


 


Absolute Monos (auto)  0.4 10^3/uL (0.1-1.4)   06/15/20  18:37    


 


Absolute Eos (auto)  0.0 10^3/uL (0.0-0.6)   06/15/20  18:37    


 


Absolute Basos (auto)  0.0 10^3/uL (0.0-0.2)   06/15/20  18:37    


 


Seg Neutrophils %  79.5 % (42-78)  H  06/15/20  18:37    


 


Urine Color  YELLOW   06/15/20  16:35    


 


Urine Appearance  SLIGHTLY-CLOUDY   06/15/20  16:35    


 


Urine pH  7.0  (5.0-9.0)   06/15/20  16:35    


 


Ur Specific Gravity  1.005   06/15/20  16:35    


 


Urine Protein  NEGATIVE mg/dL (NEGATIVE)   06/15/20  16:35    


 


Urine Glucose (UA)  NEGATIVE mg/dL (NEGATIVE)   06/15/20  16:35    


 


Urine Ketones  TRACE mg/dL (NEGATIVE)  H  06/15/20  16:35    


 


Urine Blood  NEGATIVE  (NEGATIVE)   06/15/20  16:35    


 


Urine Nitrite  NEGATIVE  (NEGATIVE)   06/15/20  16:35    


 


Urine Bilirubin  NEGATIVE  (NEGATIVE)   06/15/20  16:35    


 


Urine Urobilinogen  NEGATIVE mg/dL (<2.0)   06/15/20  16:35    


 


Ur Leukocyte Esterase  LARGE  (NEGATIVE)  H  06/15/20  16:35    


 


Urine Ascorbic Acid  NEGATIVE  (NEGATIVE)   06/15/20  16:35    


 


Fetal Membranes Rupture  NEGATIVE  (NEGATIVE)   06/15/20  16:45    


 


Urine Opiates Screen  NEGATIVE   06/15/20  16:35    


 


Urine Methadone Screen  NEGATIVE   06/15/20  16:35    


 


Ur Barbiturates Screen  NEGATIVE   06/15/20  16:35    


 


Ur Phencyclidine Scrn  NEGATIVE   06/15/20  16:35    


 


Ur Amphetamines Screen  NEGATIVE   06/15/20  16:35    


 


U Benzodiazepines Scrn  NEGATIVE   06/15/20  16:35    


 


Urine Cocaine Screen  NEGATIVE   06/15/20  16:35    


 


U Marijuana (THC) Screen  NEGATIVE   06/15/20  16:35    


 


RPR  NONREACTIVE  (NONREACTIVE)   06/15/20  18:37    


 


Blood Type  A POSITIVE   06/15/20  18:37    


 


Antibody Screen  NEGATIVE   06/15/20  18:37    














Plan


Health Concerns: 


Mental issues and taking medicines


Plan of Treatment: 


discharge home, discharge planner to see pt, [facility]


Goals: 


no complications, stable mental issues


Time Spent: Less than 30 Minutes

## 2020-06-18 NOTE — PDOC PROGRESS REPORT
Subjective-OB


Progress Note for:: 06/18/20


Subjective: 





Doing well, sitting on side of bed eating and sister in room, doing most of the 

talking, feels good about going homel going to stay with sister and has plenty 

of help, does not want to take medicine that would keep her from breastfeeding. 

Feels mood is stable, plans to breast and bottle





Physical Exam (OB)


Vital Signs: 


                                        











Temp Pulse Resp BP Pulse Ox


 


 97.7 F   74   18   115/63   100 


 


 06/17/20 20:53  06/17/20 20:53  06/17/20 20:53  06/17/20 20:53  06/17/20 20:53








                                 Intake & Output











 06/17/20 06/18/20 06/19/20





 06:59 06:59 06:59


 


Intake Total 1180 480 


 


Balance 1180 480 














- PIH/Pre-Eclampsia


DTR's: 2 +


Clonus: Negative


Headache: Absent


Epigastric Pain: No


Visual Changes: No





- Lochia


Lochia Amount: Scant < 10 ml


Lochia Color: Rubra/Red





- Abdomen


Description: Soft


Hernia Present: No


Fundal Description: Firm, Midline


Fundal Height: u/u - u/2





Objective-Diagnostic


Laboratory: 


                                        





                                 06/16/20 07:48 











Assessment and Plan(PN)





- Assessment and Plan


(1) Delivery normal


Is this a current diagnosis for this admission?: Yes   





(2) Active labor at term


Is this a current diagnosis for this admission?: Yes   





(3) Learning disability


Is this a current diagnosis for this admission?: Yes   





(4) Schizophrenia


Qualifiers: 


   Schizophrenia type: unspecified   Qualified Code(s): F20.9 - Schizophrenia, 

unspecified   


Is this a current diagnosis for this admission?: Yes   





(5) Bipolar 1 disorder


Is this a current diagnosis for this admission?: Yes   





- Time Spent with Patient


Time with patient: Less than 15 minutes


Medications reviewed and adjusted accordingly: Yes





- Disposition


Anticipated Discharge: Home


Within: within 24 hours

## 2020-10-29 ENCOUNTER — HOSPITAL ENCOUNTER (EMERGENCY)
Dept: HOSPITAL 62 - ER | Age: 20
LOS: 1 days | Discharge: HOME | End: 2020-10-30
Payer: MEDICARE

## 2020-10-29 DIAGNOSIS — F32.9: ICD-10-CM

## 2020-10-29 DIAGNOSIS — R45.851: Primary | ICD-10-CM

## 2020-10-29 DIAGNOSIS — F43.21: ICD-10-CM

## 2020-10-29 PROCEDURE — 80307 DRUG TEST PRSMV CHEM ANLYZR: CPT

## 2020-10-29 PROCEDURE — 93010 ELECTROCARDIOGRAM REPORT: CPT

## 2020-10-29 PROCEDURE — 85025 COMPLETE CBC W/AUTO DIFF WBC: CPT

## 2020-10-29 PROCEDURE — 81001 URINALYSIS AUTO W/SCOPE: CPT

## 2020-10-29 PROCEDURE — 84703 CHORIONIC GONADOTROPIN ASSAY: CPT

## 2020-10-29 PROCEDURE — 36415 COLL VENOUS BLD VENIPUNCTURE: CPT

## 2020-10-29 PROCEDURE — 80053 COMPREHEN METABOLIC PANEL: CPT

## 2020-10-29 PROCEDURE — 93005 ELECTROCARDIOGRAM TRACING: CPT

## 2020-10-29 PROCEDURE — 99285 EMERGENCY DEPT VISIT HI MDM: CPT

## 2020-10-29 NOTE — ER DOCUMENT REPORT
ED Psych Disorder / Suicide





- General


Mode of Arrival: Ambulatory


Information source: Patient


TRAVEL OUTSIDE OF THE [place] IN LAST 30 DAYS: No





- HPI


Patient complains to provider of: Suicidal ideation, Suicidal plan


Onset: Just prior to arrival


Quality of pain: No pain


Suicide Risk Factors: Depressed


Situational problems related to: Recent death, Other - Recent new mother


Associated symptoms: Depressed


Similar symptoms previously: No


Recently seen / treated by doctor: No





- Related Data


Home Medications: abilify, med for depression.





<JASON CORRIGAN - Last Filed: 10/30/20 01:25>





<IVANNA JUDD - Last Filed: 10/30/20 07:40>





<KIM ROBERTO - Last Filed: 10/30/20 12:18>





<CELESTE LINDO - Last Filed: 10/30/20 12:56>





- General


Chief Complaint: Psych Problem


Stated Complaint: DEPRESSION/PSYCH ISSUES


Time Seen by Provider: 10/29/20 23:02


Primary Care Provider: 


Moira Burnette [Outside] - Follow up as needed


KELVIN BROOKS MD [ACTIVE STAFF] - Follow up as needed


Notes: 





She presents reporting a history of depression.  Patient states she has been 

compliant with her medications.  Patient states that she has a 4-month-old at 

home and her aunt recently .  Patient states that her father  13 months 

ago.  Patient states she had suicidal thoughts with a plan to take all of her 

antidepressant medications.  Patient states she spoke with [facility] who 

advised her to come here for lab work testing. (JASON CORRIGAN)





- Related Data


Allergies/Adverse Reactions: 


                                        





cinnamon Allergy (Verified 06/15/20 17:06)


   











Past Medical History





- General


Information source: Patient





- Social History


Smoking Status: Never Smoker


Frequency of alcohol use: None


Drug Abuse: None


Occupation: None


Lives with: Family


Family History: Reviewed & Not Pertinent


Patient has suicidal ideation: Yes


Renal/ Medical History: Denies: Hx Peritoneal Dialysis


Psychiatric Medical History: Reports: Hx Attention Deficit Hyperactivity 

Disorder, Hx Depression


Surgical Hx: Negative





<JASON CORRIGAN - Last Filed: 10/30/20 01:25>





Review of Systems





- Review of Systems


Constitutional: No symptoms reported.  denies: Fever, Recent illness


EENT: No symptoms reported


Cardiovascular: No symptoms reported


Respiratory: No symptoms reported


Gastrointestinal: No symptoms reported


Genitourinary: No symptoms reported


Female Genitourinary: No symptoms reported


Musculoskeletal: No symptoms reported


Skin: No symptoms reported


Hematologic/Lymphatic: No symptoms reported


Neurological/Psychological: Suicidal ideation





<JASON CORRIGAN - Last Filed: 10/30/20 01:25>





Physical Exam





<JASON CORRIGAN - Last Filed: 10/30/20 01:25>





- Vital signs


Vitals: 





                                        











Temp Pulse Resp BP Pulse Ox


 


 98.0 F   85   18   125/64   98 


 


 10/29/20 22:57  10/29/20 22:57  10/29/20 22:57  10/29/20 22:57  10/29/20 22:57














- Notes


Notes: 





PHYSICAL EXAMINATION: 


GENERAL: Well-appearing and in no acute distress. 


HEAD: Atraumatic, normocephalic. 


EYES: sclera anicteric, conjunctiva are normal. 


ENT: nares patent. Moist mucous membranes. 


NECK: Normal range of motion, supple without lymphadenopathy 


LUNGS: CTAB and equal. No wheezes rales or rhonchi. 


HEART: Regular rate and rhythm without murmurs 


EXTREMITIES: Normal range of motion, no pitting edema. No cyanosis. 


BACK:  No CVA tenderness


NEUROLOGICAL: Cranial nerves grossly intact. Normal speech. Normal gait.


PSYCH: Flat affect


SKIN: Warm, Dry, normal turgor, no rashes or lesions noted


 (JASON CORRIGAN)





Course





- Laboratory


Result Diagrams: 


                                 10/30/20 00:10





                                 10/30/20 00:10





<JASON CORRIGAN - Last Filed: 10/30/20 01:25>





- Laboratory


Result Diagrams: 


                                 10/30/20 00:10





                                 10/30/20 00:10





<IVANNA JUDD - Last Filed: 10/30/20 07:40>





- Laboratory


Result Diagrams: 


                                 10/30/20 00:10





                                 10/30/20 00:10





<KIM ROBERTO - Last Filed: 10/30/20 12:18>





- Laboratory


Result Diagrams: 


                                 10/30/20 00:10





                                 10/30/20 00:10





<CELESTE LINDO - Last Filed: 10/30/20 12:56>





- Re-evaluation


Re-evalutation: 





10/29/20 23:18


Charge nurse advised of patient status and need for room.


10/30/20 01:25


Patient updated regarding plan of care at this time.  Patient tearful stating 

that she does not want to be here and missed her child's first Halloween.  

Patient advised that today is only .  Patient requesting to be able to

call her spouse to pick her up to leave.  Patient advised of plan to have her 

speak with one of the behavioral health team members later today. 

(JASON CORRIGAN)





10/30/20


Urinalysis unremarkable with more squamous epithelials than leukocytes.  Urine 

drug screen unremarkable.  Vital signs unremarkable.  Patient is medically 

cleared pending mental health team evaluation.


 (IVANNA JUDD)





- Vital Signs


Vital signs: 





                                        











Temp Pulse Resp BP Pulse Ox


 


 98.1 F   91   16   116/64   98 


 


 10/30/20 11:14  10/30/20 11:14  10/30/20 11:14  10/30/20 11:14  10/30/20 11:14














- Laboratory


Laboratory results interpreted by me: 





                                        











  10/30/20 10/30/20





  00:10 01:00


 


Sodium  136.9 L 


 


Carbon Dioxide  21 L 


 


Creatinine  0.47 L 


 


Ur Leukocyte Esterase   TRACE H


 


Salicylates  < 1.0 L 


 


Acetaminophen  < 10 L 














Discharge





<JASON CORRIGAN - Last Filed: 10/30/20 01:25>





<IVANNA JUDD - Last Filed: 10/30/20 07:40>





<KIM ROBERTO - Last Filed: 10/30/20 12:18>





<CELESTE LINDO - Last Filed: 10/30/20 12:56>





- Discharge


Clinical Impression: 


 Suicidal ideation, Grief





Condition: Stable


Disposition: HOME, SELF-CARE


Additional Instructions: 


You have been evaluated both medical and behavioral health teams have been 

deemed appropriate for discharge.  Your family has agreed to continue to be your

support network and be part of your plan of care.  They have agreed to ensure 

you do not have access to medications and weapons and follow through with mental

health recommendations.  Please continue working with your outpatient mental 

health provider, RAMIREZ ALDANA, for both medication management and therapy.





DEPRESSION:


     Your evaluation reveals that you have mental depression. While symptoms may

be vague, they often include disturbance of sleep, fatigue, loss of appetite, 

and general loss of interest in life.  While depression may be a side effect of 

drugs, or a reaction to a major change in your life, many cases have no known 

cause.


     If depression is acute, and related to a major loss in your life, you can 

expect it to clear completely with time.  If you have been depressed a long 

time, are prone to repeated bouts of depression or low mood, or have been 

thinking of suicide, get help.


     Depression can be treated with anti-depressant medication and counselling. 

Long-term depression will often take a few weeks to clear, even with appropriate

medication.  Follow-up care is important.








SUICIDAL IDEATION:


     Suicidal ideation is a common medical term for thoughts about suicide, 

which may be as detailed as a formulated plan, without the suicidal act itself. 

Although most people who undergo suicidal ideation do not commit suicide, some 

go on to make suicide attempts. The range of suicidal ideation varies greatly 

from fleeting to detailed planning, role playing, and unsuccessful attempts.





     While thoughts about suicide are common, most people do not carry out 

serious actions to commit suicide.  Based upon your evaluation and discussion 

with you, we do not believe you are currently at risk to act upon your thoughts 

of suicide.  You have agreed to return to the Emergency Department, at any time,

if you feel inclined to act upon your suicidal thoughts.








FOLLOW-UP CARE:


If you have been referred to a physician for follow-up care, call the 

physicians office for an appointment as you were instructed or within the next 

two days.  If you experience worsening or a significant change in your symptoms,

notify the physician immediately or return to the Emergency Department at any 

time for re-evaluation.





Referrals: 


KELVIN BROOKS MD [ACTIVE STAFF] - Follow up as needed


Moira Burnette [Outside] - Follow up as needed

## 2020-10-30 VITALS — DIASTOLIC BLOOD PRESSURE: 65 MMHG | SYSTOLIC BLOOD PRESSURE: 127 MMHG

## 2020-10-30 LAB
ADD MANUAL DIFF: NO
ALBUMIN SERPL-MCNC: 4.5 G/DL (ref 3.5–5)
ALP SERPL-CCNC: 83 U/L (ref 38–126)
ANION GAP SERPL CALC-SCNC: 12 MMOL/L (ref 5–19)
APAP SERPL-MCNC: < 10 UG/ML (ref 10–30)
APPEARANCE UR: (no result)
APTT PPP: (no result) S
AST SERPL-CCNC: 29 U/L (ref 14–36)
BARBITURATES UR QL SCN: NEGATIVE
BASOPHILS # BLD AUTO: 0 10^3/UL (ref 0–0.2)
BASOPHILS NFR BLD AUTO: 0.5 % (ref 0–2)
BILIRUB DIRECT SERPL-MCNC: 0.1 MG/DL (ref 0–0.4)
BILIRUB SERPL-MCNC: 0.2 MG/DL (ref 0.2–1.3)
BILIRUB UR QL STRIP: NEGATIVE
BUN SERPL-MCNC: 12 MG/DL (ref 7–20)
CALCIUM: 9.9 MG/DL (ref 8.4–10.2)
CHLORIDE SERPL-SCNC: 104 MMOL/L (ref 98–107)
CO2 SERPL-SCNC: 21 MMOL/L (ref 22–30)
EOSINOPHIL # BLD AUTO: 0.2 10^3/UL (ref 0–0.6)
EOSINOPHIL NFR BLD AUTO: 2.5 % (ref 0–6)
ERYTHROCYTE [DISTWIDTH] IN BLOOD BY AUTOMATED COUNT: 12.6 % (ref 11.5–14)
ETHANOL SERPL-MCNC: < 10 MG/DL
GLUCOSE SERPL-MCNC: 100 MG/DL (ref 75–110)
GLUCOSE UR STRIP-MCNC: NEGATIVE MG/DL
HCT VFR BLD CALC: 36.8 % (ref 36–47)
HGB BLD-MCNC: 12.9 G/DL (ref 12–15.5)
KETONES UR STRIP-MCNC: NEGATIVE MG/DL
LYMPHOCYTES # BLD AUTO: 2.7 10^3/UL (ref 0.5–4.7)
LYMPHOCYTES NFR BLD AUTO: 29.3 % (ref 13–45)
MCH RBC QN AUTO: 29.4 PG (ref 27–33.4)
MCHC RBC AUTO-ENTMCNC: 35 G/DL (ref 32–36)
MCV RBC AUTO: 84 FL (ref 80–97)
METHADONE UR QL SCN: NEGATIVE
MONOCYTES # BLD AUTO: 0.5 10^3/UL (ref 0.1–1.4)
MONOCYTES NFR BLD AUTO: 5.9 % (ref 3–13)
NEUTROPHILS # BLD AUTO: 5.6 10^3/UL (ref 1.7–8.2)
NEUTS SEG NFR BLD AUTO: 61.8 % (ref 42–78)
NITRITE UR QL STRIP: NEGATIVE
PCP UR QL SCN: NEGATIVE
PH UR STRIP: 6 [PH] (ref 5–9)
PLATELET # BLD: 354 10^3/UL (ref 150–450)
POTASSIUM SERPL-SCNC: 4.4 MMOL/L (ref 3.6–5)
PROT SERPL-MCNC: 7.7 G/DL (ref 6.3–8.2)
PROT UR STRIP-MCNC: NEGATIVE MG/DL
RBC # BLD AUTO: 4.38 10^6/UL (ref 3.72–5.28)
SALICYLATES SERPL-MCNC: < 1 MG/DL (ref 2–20)
SP GR UR STRIP: 1.01
TOTAL CELLS COUNTED % (AUTO): 100 %
URINE AMPHETAMINES SCREEN: NEGATIVE
URINE BENZODIAZEPINES SCREEN: NEGATIVE
URINE COCAINE SCREEN: NEGATIVE
URINE MARIJUANA (THC) SCREEN: NEGATIVE
UROBILINOGEN UR-MCNC: NEGATIVE MG/DL (ref ?–2)
WBC # BLD AUTO: 9.1 10^3/UL (ref 4–10.5)

## 2020-10-30 NOTE — EKG REPORT
SEVERITY:- ABNORMAL ECG -

SINUS RHYTHM

LEFT ATRIAL ABNORMALITY

:

Confirmed by: Chino Galarza MD 30-Oct-2020 08:52:17

## 2020-10-30 NOTE — ER DOCUMENT REPORT
Doctor's Note


Notes: 





10/30/20 12:55


Patient has been evaluated by the psychiatric team who feel she is safe for 

discharge.  They have involve the family who are comfortable taking the patient 

home.  They are on the way in to pick the patient up.  I spoke with the patient 

she voices no thoughts of harming herself or harming others at this point.  She 

states that she will make closer outpatient follow-up visits with her therapist.

 She denies other complaints at this time.  She is answering all questions 

appropriately.

## 2020-10-30 NOTE — PSYCHOLOGICAL NOTE
Psych Note





- Psych Note


Date seen by psych provider: 10/30/20


Time seen by psych provider: 11:45


Psych Note: 


Reason for Consult: Suicidal ideation


Consent Permissions: jean Alvarado Yolanda, [phone]





Patient arrived to [facility] ED via via POV for concerns of suicidal ideation.





Clinical Presentation:


Acute grief


Passive suicidal ideation i.e. no intent


                           IVC Criteria per [facility] 122C


                               Dangerous to others


Within the relevant past the individual


No   has inflicted or attempted to inflict or threatened to inflict serious 

bodily harm on another


                                       AND


No   that there is a reasonable probability that this conduct will be repeated. 





                                       OR





No   has acted in such a way as to create a substantial risk of serious bodily 

harm to another


                                       AND


No   that there is a reasonable probability that this conduct will be repeated. 





                                       OR





No   has engaged in extreme destruction of property


                                       AND


NO   that there is a reasonable probability that this conduct will be repeated. 








Previous episodes of dangerousness to others, when applicable, may be considered

when determining reasonable probability of future dangerous conduct. Clear, 

cogent, and convincing evidence that an individual has committed a homicide in 

the relevant past is prima facie evidence of dangerousness to others.








                                Dangerous to self


Within the relevant past the individual has done any of the following: 


         acted in such a way as to show ALL of the following: 





No    The individual would be unable without care, supervision, and the 

continued assistance of others not otherwise available, to exercise self-

control, judgment, and discretion in the conduct of the individual's daily 

responsibilities and social relations or to satisfy the individual's need for 

nourishment, personal or medical care, shelter, or self-protection and safety. 


                                       AND


No    There is a reasonable probability of the individual suffering serious 

physical debilitation within the near future unless adequate treatment is given.

A showing of behavior that is grossly irrational, of actions that the individual

is unable to control, of behavior that is grossly inappropriate to the 

situation, or of other evidence of severely impaired insight and judgment shall 

create a prima facie inference that the individual is unable to care for himself

or herself. 


                                       OR


YES   has attempted suicide or threatened suicide 


                                       AND


No   that there is a reasonable probability of suicide unless adequate treatment

is given





Patient voluntarily came in to [facility] ED for an evaluation after expressing 

thoughts of wanting to harm herself.  Patient has no history of engaging in 

self-harm or suicide attempts.  Patient is experiencing acute grief currently 

after lost of her aunt 4 days ago.  Patient has an outpatient mental health 

provider and is receiving both medication management and therapeutic services.  

Patient's family agreed to be part of patient's plan of care and have already 

locked up all medications and confirmed that she will have no access to 

medications or weapons.  Patient has independently taken steps into identifying 

the need to increase therapy engage in more self-care activities such as 

walking.





                                       OR


No   has mutilated himself or herself or attempted to mutilate himself or 

herself 


                                       AND


No   that there is a reasonable probability of serious self-mutilation unless 

adequate treatment is given. 





NOTE: Previous episodes of dangerousness to self, when applicable, may be 

considered when determining reasonable probability of physical debilitation, 

suicide, or self-mutilation.





Impression\plan: Patient is commended for rescind of 24-hour petition for 

evaluation and is cleared from acute psychiatric services; paperwork is signed 

and placed in patient's chart.  Patient presented voluntarily after having 

thoughts of harming herself due to acute stress and grief after losing her aunt 

form days ago.  Patient denies she wants to die and states that she has 

identified the need to increase her therapy, continue medication as directed and

increase self-care and coping skill of walking.  Patient's family confirm they 

will be part of patient's plan of care and have identified they have already 

locked up all medications in the home.  It is confirmed the patient will not 

have any access to medications and weapons will follow through with mental 

health recommendations patient and patient's family feel comfortable with plan 

of care.  Patient is recommended to follow-up with her outpatient mental health 

provider, RAMIREZ ALDANA, for both medication management and therapeutic services.  At 

this time medication adjustments are not recommended as patient identifies her 

medications were working very well until situational stressor i.e. the loss of 

her aunt occurred.  Patient's brother-in-law and fianc will be coming to [facility] ED

to  the patient for transport home. Dr. Jones was consulted to care 

management of this patient; attending physicians in agreement with 

recommendations and disposition.

## 2020-12-19 ENCOUNTER — HOSPITAL ENCOUNTER (EMERGENCY)
Dept: HOSPITAL 62 - ER | Age: 20
Discharge: HOME | End: 2020-12-19
Payer: MEDICARE

## 2020-12-19 VITALS — SYSTOLIC BLOOD PRESSURE: 124 MMHG | DIASTOLIC BLOOD PRESSURE: 69 MMHG

## 2020-12-19 DIAGNOSIS — Z79.899: ICD-10-CM

## 2020-12-19 DIAGNOSIS — F31.9: Primary | ICD-10-CM

## 2020-12-19 DIAGNOSIS — F90.9: ICD-10-CM

## 2020-12-19 DIAGNOSIS — F91.9: ICD-10-CM

## 2020-12-19 LAB
ADD MANUAL DIFF: NO
ALBUMIN SERPL-MCNC: 4.7 G/DL (ref 3.5–5)
ALP SERPL-CCNC: 94 U/L (ref 38–126)
ANION GAP SERPL CALC-SCNC: 12 MMOL/L (ref 5–19)
APAP SERPL-MCNC: < 10 UG/ML (ref 10–30)
APPEARANCE UR: (no result)
APTT PPP: YELLOW S
AST SERPL-CCNC: 43 U/L (ref 14–36)
BARBITURATES UR QL SCN: NEGATIVE
BASOPHILS # BLD AUTO: 0.1 10^3/UL (ref 0–0.2)
BASOPHILS NFR BLD AUTO: 0.5 % (ref 0–2)
BILIRUB DIRECT SERPL-MCNC: 0.2 MG/DL (ref 0–0.4)
BILIRUB SERPL-MCNC: 0.4 MG/DL (ref 0.2–1.3)
BILIRUB UR QL STRIP: NEGATIVE
BUN SERPL-MCNC: 18 MG/DL (ref 7–20)
CALCIUM: 10.4 MG/DL (ref 8.4–10.2)
CHLORIDE SERPL-SCNC: 102 MMOL/L (ref 98–107)
CO2 SERPL-SCNC: 25 MMOL/L (ref 22–30)
EOSINOPHIL # BLD AUTO: 0.2 10^3/UL (ref 0–0.6)
EOSINOPHIL NFR BLD AUTO: 1.7 % (ref 0–6)
ERYTHROCYTE [DISTWIDTH] IN BLOOD BY AUTOMATED COUNT: 13.8 % (ref 11.5–14)
ETHANOL SERPL-MCNC: < 10 MG/DL
GLUCOSE SERPL-MCNC: 114 MG/DL (ref 75–110)
GLUCOSE UR STRIP-MCNC: NEGATIVE MG/DL
HCT VFR BLD CALC: 39 % (ref 36–47)
HGB BLD-MCNC: 13.5 G/DL (ref 12–15.5)
KETONES UR STRIP-MCNC: NEGATIVE MG/DL
LYMPHOCYTES # BLD AUTO: 1.8 10^3/UL (ref 0.5–4.7)
LYMPHOCYTES NFR BLD AUTO: 16.5 % (ref 13–45)
MCH RBC QN AUTO: 28.6 PG (ref 27–33.4)
MCHC RBC AUTO-ENTMCNC: 34.5 G/DL (ref 32–36)
MCV RBC AUTO: 83 FL (ref 80–97)
METHADONE UR QL SCN: NEGATIVE
MONOCYTES # BLD AUTO: 0.5 10^3/UL (ref 0.1–1.4)
MONOCYTES NFR BLD AUTO: 4.7 % (ref 3–13)
NEUTROPHILS # BLD AUTO: 8.4 10^3/UL (ref 1.7–8.2)
NEUTS SEG NFR BLD AUTO: 76.6 % (ref 42–78)
NITRITE UR QL STRIP: NEGATIVE
PCP UR QL SCN: NEGATIVE
PH UR STRIP: 5 [PH] (ref 5–9)
PLATELET # BLD: 374 10^3/UL (ref 150–450)
POTASSIUM SERPL-SCNC: 4.5 MMOL/L (ref 3.6–5)
PROT SERPL-MCNC: 7.9 G/DL (ref 6.3–8.2)
PROT UR STRIP-MCNC: 30 MG/DL
RBC # BLD AUTO: 4.7 10^6/UL (ref 3.72–5.28)
SALICYLATES SERPL-MCNC: < 1 MG/DL (ref 2–20)
SP GR UR STRIP: 1.02
TOTAL CELLS COUNTED % (AUTO): 100 %
URINE AMPHETAMINES SCREEN: NEGATIVE
URINE BENZODIAZEPINES SCREEN: NEGATIVE
URINE COCAINE SCREEN: NEGATIVE
URINE MARIJUANA (THC) SCREEN: NEGATIVE
UROBILINOGEN UR-MCNC: NEGATIVE MG/DL (ref ?–2)
WBC # BLD AUTO: 11 10^3/UL (ref 4–10.5)

## 2020-12-19 PROCEDURE — 99284 EMERGENCY DEPT VISIT MOD MDM: CPT

## 2020-12-19 PROCEDURE — 93005 ELECTROCARDIOGRAM TRACING: CPT

## 2020-12-19 PROCEDURE — 84703 CHORIONIC GONADOTROPIN ASSAY: CPT

## 2020-12-19 PROCEDURE — 80053 COMPREHEN METABOLIC PANEL: CPT

## 2020-12-19 PROCEDURE — 85025 COMPLETE CBC W/AUTO DIFF WBC: CPT

## 2020-12-19 PROCEDURE — 93010 ELECTROCARDIOGRAM REPORT: CPT

## 2020-12-19 PROCEDURE — 81001 URINALYSIS AUTO W/SCOPE: CPT

## 2020-12-19 PROCEDURE — 36415 COLL VENOUS BLD VENIPUNCTURE: CPT

## 2020-12-19 PROCEDURE — 80307 DRUG TEST PRSMV CHEM ANLYZR: CPT
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
## 2020-12-19 NOTE — EKG REPORT
SEVERITY:- BORDERLINE ECG -

SINUS TACHYCARDIA

BORDERLINE T WAVE ABNORMALITIES

:

Confirmed by: Gabby Forbes MD 19-Dec-2020 17:26:16